# Patient Record
Sex: FEMALE | Race: OTHER | NOT HISPANIC OR LATINO | URBAN - METROPOLITAN AREA
[De-identification: names, ages, dates, MRNs, and addresses within clinical notes are randomized per-mention and may not be internally consistent; named-entity substitution may affect disease eponyms.]

---

## 2023-08-21 ENCOUNTER — EMERGENCY (EMERGENCY)
Facility: HOSPITAL | Age: 88
LOS: 1 days | Discharge: HOME | End: 2023-08-21
Attending: EMERGENCY MEDICINE
Payer: COMMERCIAL

## 2023-08-21 VITALS
OXYGEN SATURATION: 96 % | RESPIRATION RATE: 16 BRPM | TEMPERATURE: 98 F | HEIGHT: 62 IN | DIASTOLIC BLOOD PRESSURE: 90 MMHG | HEART RATE: 67 BPM | WEIGHT: 110.01 LBS | SYSTOLIC BLOOD PRESSURE: 136 MMHG

## 2023-08-21 PROCEDURE — 99285 EMERGENCY DEPT VISIT HI MDM: CPT

## 2023-08-21 NOTE — ED ADULT TRIAGE NOTE - CHIEF COMPLAINT QUOTE
Pt's family reports disorientation and weakness starting four days ago. Pt seen by PMD with abnormal MRI. Denies current symptoms. Neuro exam intact. A&Ox4 in triage, equal strength

## 2023-08-22 VITALS
HEART RATE: 59 BPM | OXYGEN SATURATION: 98 % | SYSTOLIC BLOOD PRESSURE: 148 MMHG | RESPIRATION RATE: 16 BRPM | DIASTOLIC BLOOD PRESSURE: 67 MMHG | TEMPERATURE: 98 F

## 2023-08-22 LAB
ALBUMIN SERPL ELPH-MCNC: 4.2 G/DL — SIGNIFICANT CHANGE UP (ref 3.3–5)
ALP SERPL-CCNC: 61 U/L — SIGNIFICANT CHANGE UP (ref 40–120)
ALT FLD-CCNC: 10 U/L — SIGNIFICANT CHANGE UP (ref 10–45)
ANION GAP SERPL CALC-SCNC: 12 MMOL/L — SIGNIFICANT CHANGE UP (ref 5–17)
APTT BLD: 28.1 SEC — SIGNIFICANT CHANGE UP (ref 24.5–35.6)
AST SERPL-CCNC: 14 U/L — SIGNIFICANT CHANGE UP (ref 10–40)
BASOPHILS # BLD AUTO: 0.04 K/UL — SIGNIFICANT CHANGE UP (ref 0–0.2)
BASOPHILS NFR BLD AUTO: 0.5 % — SIGNIFICANT CHANGE UP (ref 0–2)
BILIRUB SERPL-MCNC: 0.2 MG/DL — SIGNIFICANT CHANGE UP (ref 0.2–1.2)
BUN SERPL-MCNC: 15 MG/DL — SIGNIFICANT CHANGE UP (ref 7–23)
CALCIUM SERPL-MCNC: 9.9 MG/DL — SIGNIFICANT CHANGE UP (ref 8.4–10.5)
CHLORIDE SERPL-SCNC: 104 MMOL/L — SIGNIFICANT CHANGE UP (ref 96–108)
CHOLEST SERPL-MCNC: 224 MG/DL — HIGH
CO2 SERPL-SCNC: 24 MMOL/L — SIGNIFICANT CHANGE UP (ref 22–31)
CREAT SERPL-MCNC: 0.74 MG/DL — SIGNIFICANT CHANGE UP (ref 0.5–1.3)
EGFR: 77 ML/MIN/1.73M2 — SIGNIFICANT CHANGE UP
EOSINOPHIL # BLD AUTO: 0.39 K/UL — SIGNIFICANT CHANGE UP (ref 0–0.5)
EOSINOPHIL NFR BLD AUTO: 5.1 % — SIGNIFICANT CHANGE UP (ref 0–6)
GLUCOSE SERPL-MCNC: 117 MG/DL — HIGH (ref 70–99)
HCT VFR BLD CALC: 40.9 % — SIGNIFICANT CHANGE UP (ref 34.5–45)
HDLC SERPL-MCNC: 73 MG/DL — SIGNIFICANT CHANGE UP
HGB BLD-MCNC: 12.5 G/DL — SIGNIFICANT CHANGE UP (ref 11.5–15.5)
IMM GRANULOCYTES NFR BLD AUTO: 0.3 % — SIGNIFICANT CHANGE UP (ref 0–0.9)
INR BLD: 0.96 RATIO — SIGNIFICANT CHANGE UP (ref 0.85–1.18)
LIPID PNL WITH DIRECT LDL SERPL: 120 MG/DL — HIGH
LYMPHOCYTES # BLD AUTO: 3.42 K/UL — HIGH (ref 1–3.3)
LYMPHOCYTES # BLD AUTO: 44.9 % — HIGH (ref 13–44)
MCHC RBC-ENTMCNC: 25.8 PG — LOW (ref 27–34)
MCHC RBC-ENTMCNC: 30.6 GM/DL — LOW (ref 32–36)
MCV RBC AUTO: 84.5 FL — SIGNIFICANT CHANGE UP (ref 80–100)
MONOCYTES # BLD AUTO: 0.6 K/UL — SIGNIFICANT CHANGE UP (ref 0–0.9)
MONOCYTES NFR BLD AUTO: 7.9 % — SIGNIFICANT CHANGE UP (ref 2–14)
NEUTROPHILS # BLD AUTO: 3.15 K/UL — SIGNIFICANT CHANGE UP (ref 1.8–7.4)
NEUTROPHILS NFR BLD AUTO: 41.3 % — LOW (ref 43–77)
NON HDL CHOLESTEROL: 151 MG/DL — HIGH
NRBC # BLD: 0 /100 WBCS — SIGNIFICANT CHANGE UP (ref 0–0)
PLATELET # BLD AUTO: 413 K/UL — HIGH (ref 150–400)
POTASSIUM SERPL-MCNC: 4.1 MMOL/L — SIGNIFICANT CHANGE UP (ref 3.5–5.3)
POTASSIUM SERPL-SCNC: 4.1 MMOL/L — SIGNIFICANT CHANGE UP (ref 3.5–5.3)
PROT SERPL-MCNC: 7.4 G/DL — SIGNIFICANT CHANGE UP (ref 6–8.3)
PROTHROM AB SERPL-ACNC: 10.6 SEC — SIGNIFICANT CHANGE UP (ref 9.5–13)
RBC # BLD: 4.84 M/UL — SIGNIFICANT CHANGE UP (ref 3.8–5.2)
RBC # FLD: 18.7 % — HIGH (ref 10.3–14.5)
SODIUM SERPL-SCNC: 140 MMOL/L — SIGNIFICANT CHANGE UP (ref 135–145)
TRIGL SERPL-MCNC: 182 MG/DL — HIGH
TROPONIN T, HIGH SENSITIVITY RESULT: 18 NG/L — SIGNIFICANT CHANGE UP (ref 0–51)
WBC # BLD: 7.62 K/UL — SIGNIFICANT CHANGE UP (ref 3.8–10.5)
WBC # FLD AUTO: 7.62 K/UL — SIGNIFICANT CHANGE UP (ref 3.8–10.5)

## 2023-08-22 PROCEDURE — 84484 ASSAY OF TROPONIN QUANT: CPT

## 2023-08-22 PROCEDURE — 85730 THROMBOPLASTIN TIME PARTIAL: CPT

## 2023-08-22 PROCEDURE — 85025 COMPLETE CBC W/AUTO DIFF WBC: CPT

## 2023-08-22 PROCEDURE — 80061 LIPID PANEL: CPT

## 2023-08-22 PROCEDURE — 93005 ELECTROCARDIOGRAM TRACING: CPT

## 2023-08-22 PROCEDURE — 85610 PROTHROMBIN TIME: CPT

## 2023-08-22 PROCEDURE — 84100 ASSAY OF PHOSPHORUS: CPT

## 2023-08-22 PROCEDURE — 36415 COLL VENOUS BLD VENIPUNCTURE: CPT

## 2023-08-22 PROCEDURE — 71045 X-RAY EXAM CHEST 1 VIEW: CPT

## 2023-08-22 PROCEDURE — 71045 X-RAY EXAM CHEST 1 VIEW: CPT | Mod: 26

## 2023-08-22 PROCEDURE — 80053 COMPREHEN METABOLIC PANEL: CPT

## 2023-08-22 PROCEDURE — 83735 ASSAY OF MAGNESIUM: CPT

## 2023-08-22 PROCEDURE — 99285 EMERGENCY DEPT VISIT HI MDM: CPT | Mod: 25

## 2023-08-22 RX ORDER — CLOPIDOGREL BISULFATE 75 MG/1
1 TABLET, FILM COATED ORAL
Qty: 21 | Refills: 0
Start: 2023-08-22 | End: 2023-09-11

## 2023-08-22 RX ORDER — ASPIRIN/CALCIUM CARB/MAGNESIUM 324 MG
1 TABLET ORAL
Qty: 30 | Refills: 0
Start: 2023-08-22 | End: 2023-09-20

## 2023-08-22 RX ORDER — ATORVASTATIN CALCIUM 80 MG/1
1 TABLET, FILM COATED ORAL
Qty: 30 | Refills: 0
Start: 2023-08-22 | End: 2023-09-20

## 2023-08-22 NOTE — ED PROVIDER NOTE - CLINICAL SUMMARY MEDICAL DECISION MAKING FREE TEXT BOX
Dr. Mack's Note: Patient presents for evaluation of abnormal MRI in the setting of acute onset neuro symptoms 5 days old including what the patient's  describes as generalized weakness without any focality, confusion, slurred speech.  Patient talked to her PCP who ordered an outpatient MRI that was done today and upon the PCP receiving the results they called the patient to tell her to come immediately to the ER because of concern for stroke.  on exam patient is in no acute distress, well-appearing, unremarkable vital signs.  She does exhibit left-sided weakness 4 out of 5 strength in both upper and lower extremities along with a left-sided mild facial droop.  Retrospectively,  does agree that she had a more pronounced facial droop 5 days ago which has since then improved.  Patient also has been regaining some of his strength over those last few days.    Results from her outpatient MRI shows right-sided focus of acute to subacute infarct involving the right ganglial capsular region and adjacent frontal lobe white matter.  No acute hemorrhage.  It also shows a small old infarct in the right posterior temporal lobe.    Patient is outside of the window for any acute stroke intervention, however she will get evaluated by the stroke team for potential admission for further imaging, PT OT, further neuro care.

## 2023-08-22 NOTE — ED PROVIDER NOTE - PATIENT PORTAL LINK FT
You can access the FollowMyHealth Patient Portal offered by [facility] by registering at the following website: [email or web address]. By joining [employer]’s FollowMyHealth portal, you will also be able to view your health information using other applications (apps) compatible with our system.

## 2023-08-22 NOTE — ED PROVIDER NOTE - CARE PROVIDER_API CALL
Julio Cesar Ríos  Neurology  [address], [address]  [place] [address]  Phone: [phone]  Fax: [phone]  Follow Up Time:

## 2023-08-22 NOTE — CONSULT NOTE ADULT - SUBJECTIVE AND OBJECTIVE BOX
Neurology - Consult Note    -  Spectra: [phone] ([facility]), [phone] ([facility])  -    HPI: Patient ASHLEY SHELDON is a 89y (24-Dec-1933) Ion speaking woman with HTN, DM2 sent PCP for MRI B showing acute to subacute infarct. Patient reports 5-6 days ago starting to have slurring of her speech and left sided weakness. She went to her PCP and MRI B was ordered showing acute to subacute right ganglial capsular and adjacent frontal lobe. Patient currently takes asa 81 mg for unclear reason. At baseline, patient walks with cane. Denies numbness, changes to vision, headache, dizziness, heart palpitations. Ion  used.      NIHSS 3  premRS 3    Review of Systems:    All other review of systems is negative unless indicated above.    Allergies:  No Known Allergies      PMHx/PSHx/Family Hx: As above, otherwise see below       Social Hx:  No current use of tobacco, alcohol, or illicit drugs      Medications:  MEDICATIONS  (STANDING):    MEDICATIONS  (PRN):      Vitals:  T(C): 36.3 (08-22-23 @ 02:15), Max: 36.8 (08-21-23 @ 19:40)  HR: 65 (08-22-23 @ 02:15) (65 - 67)  BP: 150/79 (08-22-23 @ 02:15) (136/90 - 150/79)  RR: 16 (08-22-23 @ 02:15) (16 - 16)  SpO2: 95% (08-22-23 @ 02:15) (95% - 96%)    Physical Examination:  General - NAD    Neurologic Exam:  Mental status - Awake, Alert, Oriented to person, place, and Month, not year. Speech mildly dysarthric, fluent, repetition and naming intact. Follows simple commands only    Cranial nerves - PERRL, BTT b/l, EOMI, face sensation (V1-V3) intact b/l, Left nasolabial fold flattening, hearing intact b/l, palate with symmetric elevation, trapezius  5/5 strength b/l, tongue midline on protrusion with full lateral movement    Motor - Normal bulk and tone throughout. Left pronator drift.  Strength testing  Right arm antigravity no drift  Left arm antigravity mild drift  Left leg-pain limited, antigravity minor drift  Right leg antigravity with drift     Sensation - Light touch/temperature  intact throughout      Coordination - Finger to Nose intact b/l. No tremors appreciated        Labs:                        12.5   7.62  )-----------( 413      ( 22 Aug 2023 02:51 )             40.9     08-22    140  |  104  |  15  ----------------------------<  117<H>  4.1   |  24  |  0.74    Ca    9.9      22 Aug 2023 02:51  Phos  4.3     08-22  Mg     1.9     08-22    TPro  7.4  /  Alb  4.2  /  TBili  0.2  /  DBili  x   /  AST  14  /  ALT  10  /  AlkPhos  61  08-22    CAPILLARY BLOOD GLUCOSE        LIVER FUNCTIONS - ( 22 Aug 2023 02:51 )  Alb: 4.2 g/dL / Pro: 7.4 g/dL / ALK PHOS: 61 U/L / ALT: 10 U/L / AST: 14 U/L / GGT: x             PT/INR - ( 22 Aug 2023 02:51 )   PT: 10.6 sec;   INR: 0.96 ratio         PTT - ( 22 Aug 2023 02:51 )  PTT:28.1 sec            Radiology:  MRI B w/o per chart review:  MRI shows right-sided focus of acute to subacute infarct involving the right ganglial capsular region and adjacent frontal lobe white matter.  No acute hemorrhage.  It also shows a small old infarct in the right posterior temporal lobe.

## 2023-08-22 NOTE — CONSULT NOTE ADULT - ASSESSMENT
89y (24-Dec-1933) Ion speaking woman with HTN, DM2 sent PCP for MRI B showing acute to subacute infarct. Patient reports 5-6 days ago starting to have slurring of her speech and left sided weakness. She went to her PCP and MRI B was ordered showing acute to subacute right ganglial capsular and adjacent frontal lobe. Patient currently takes asa 81 mg for unclear reason. At baseline, patient walks with cane. Denies numbness, changes to vision, headache, dizziness, heart palpitations. On exam, left pronator drift.     Impression: Left sided weakness and dysarthria due to acute to subacute right ganglial capsular and adjacent frontal lobe Mechanism small vessel disease     Recommendation:  [] Continue ASA 81 mg daily  [] Start Plavix 75 mg for 3 weeks  [] Atorvastatin 80MG QHS, titrate to LDL<70  [] TTE and ILR as outpatient  [] Send HbA1C and Lipid Panel, follow results as outpatient   [] Normotension  [] No neurological contraindication for discharge   [] Follow up with Dr. Julio Cesar Ríos Neurology [address], [place] [address]     Case discussed with stroke fellow Dr Nguyen undersupervision of stroke attending Dr. Prasad.       89y (24-Dec-1933) Ion speaking woman with HTN, DM2 sent PCP for MRI B showing acute to subacute infarct. Patient reports 5-6 days ago starting to have slurring of her speech and left sided weakness. She went to her PCP and MRI B was ordered showing acute to subacute right ganglial capsular and adjacent frontal lobe. Patient currently takes asa 81 mg for unclear reason. At baseline, patient walks with cane. Denies numbness, changes to vision, headache, dizziness, heart palpitations. On exam, left pronator drift.     Impression: Mild Left upper extremity weakness with dysarthria due to acute to subacute right ganglial capsular and adjacent frontal lobe Mechanism small vessel disease     Recommendation:  [] Continue ASA 81 mg daily  [] Start Plavix 75 mg for 3 weeks  [] Atorvastatin 80MG QHS, titrate to LDL<70  [] TTE and ILR as outpatient  [] Send HbA1C and Lipid Panel, follow results as outpatient   [] Normotension  [] No neurological contraindication for discharge   [] Follow up with Dr. Julio Cesar Ríos Neurology [address], [place] [address]     Case discussed with stroke fellow Dr Nguyen undersupervision of stroke attending Dr. Prasad.

## 2023-08-22 NOTE — ED ADULT NURSE NOTE - OBJECTIVE STATEMENT
89 y/o female PMHx                  arrives to [facility] ED by car from home with family with c/o abnormal MRI. Pt's family provides history at bedside, states patient has onset left facial droop and left sided weakness 5 days ago, PCP ordered outpatient MRI for yesterday, sent to ED for brain MRI results showing right-sided focus of acute to subacute infarct. Patient is A&Ox4, residual left sided facial droop and 4/5 LUE and LLE weakness. Respirations spontaneous and unlabored. Denies SOB, dyspnea, cough, chest pain, palpitations. No abdominal pain, soft NT/ND. No n/v/d. Denies urinary symptoms. Denies fever/chills. No sick contacts. Skin is warm/dry and normal for race. Ambulates with a cane at baseline. 88 y/o female PMHx HTN, DM2 arrives to [facility] ED by car from home with family with c/o abnormal MRI. Pt's family provides history at bedside, states patient has onset left facial droop and left sided weakness 5 days ago, PCP ordered outpatient MRI for yesterday, sent to ED for abnormal brain MRI results showing right-sided focus of acute to subacute infarct. Patient is A&Ox4, residual left sided facial droop and 4/5 LUE and LLE weakness. Respirations spontaneous and unlabored. Denies SOB, dyspnea, cough, chest pain, palpitations. No abdominal pain, soft NT/ND. No n/v/d. Denies urinary symptoms. Denies fever/chills. No sick contacts. Skin is warm/dry and normal for race. Ambulates with a cane at baseline.

## 2023-08-22 NOTE — ED PROVIDER NOTE - OBJECTIVE STATEMENT
Patient is a 89-year-old female with history of hypertension, hypothyroidism, diabetes and baby aspirin sent in by PMD for concerns of stroke.  Accompanied by son at the bedside who provides collateral information.  States about 5 days ago patient was complaining of generalized weakness with confusion and slurred speech. Family also noted left facial droop.  Went to see her PCP who ordered an MRI which was done today and was instructed to come to the emergency department.  Denies any recent fever, vomiting, chest pain, shortness of breath, abdominal pain, urinary or bowel complaints.

## 2023-08-22 NOTE — ED ADULT NURSE NOTE - SUICIDE SCREENING QUESTION 3
Hpi Title: Evaluation of Skin Lesions How Severe Are Your Spot(S)?: mild Have Your Spot(S) Been Treated In The Past?: has not been treated No

## 2023-08-22 NOTE — ED PROVIDER NOTE - PROGRESS NOTE DETAILS
David PGY3  Consulted neuro for further recommendation. David PGY3  Discussed with neuro - recommends outpatient follow up.   Patient ambulated to the bathroom on her own. Attempted to reach son without answer. David PGY3  Discussed with patient's son - agreeable with neurology. Refugio provide neurology referral. And send medications per neuro rec. David PGY3  Discussed with the patient and patient's son - agreeable with discharge with outpatient neurology follow up. Discussed results so far in the ED and will send medications per neuro rec.

## 2023-08-22 NOTE — ED PROVIDER NOTE - PHYSICAL EXAMINATION
Vitals: I have reviewed the patients vital signs  General: Well dressed, well appearing, no acute distress  HEENT: Atraumatic, normocephalic, airway patent  Eyes: EOMI, tracking appropriately  Neck: no tracheal deviation, no JVD  Chest/Lungs: no trauma, symmetric chest rise, speaking in complete sentences, no WOB  Heart: skin and extremities well perfused, regular rate and rhythm  Abdomen: soft, nontender and nondistended   Neuro: A+Ox3, 4/5 strength in LUE/LLE, L facial droop, normal sensation in all 4 extremities, intact finger-to-nose   MSK: strength at baseline in all extremities, no muscle wasting or atrophy  Skin: no cyanosis, no jaundice, no new emergent lesions

## 2023-08-22 NOTE — ED PROVIDER NOTE - NSFOLLOWUPINSTRUCTIONS_ED_ALL_ED_FT
You were seen in the emergency department for slurred speech, confusion.   Your workup in the emergency department includes bloodwork.   You can find the results of all the tests in this discharge packet.   Please follow up with your primary care doctor within 48 hours for continuation of care.   Please follow up with neurology for further management.   Return to the emergency department if you experience any new/concerning/worsening symptoms such as but not limited to: fever (>100.3F), intractable nausea, vomiting, chest pain, shortness of breath, abdominal pain.     Neurology recommends that we start these medications:  1) Aspirin: 81mg once a day   2) Plavix: 75mg once a day for 3 weeks   3) Atorvastatin:  80mg once a day

## 2023-08-22 NOTE — ED ADULT NURSE NOTE - NSFALLHARMRISKINTERV_ED_ALL_ED
Monitor for mental status changes and reorient to person, place, and time, as needed/Reinforce activity limits and safety measures with patient and family/Bed in lowest position, wheels locked, appropriate side rails in place/[place] to call system/Physically safe environment - no spills, clutter or unnecessary equipment

## 2023-08-23 NOTE — ED POST DISCHARGE NOTE - DETAILS
8/23: kamila to call back admin line using language line  [id number] 8/24: LM on pts VM to call back admin line in regards to results. Gagan George PA-C 8/25: spoke with patient and son, made aware of results and rec to repeat in a few months with pmd to determine if medication is working - Leena Castillo PA-C

## 2024-01-29 ENCOUNTER — NON-APPOINTMENT (OUTPATIENT)
Age: 89
End: 2024-01-29

## 2024-02-06 PROBLEM — Z00.00 ENCOUNTER FOR PREVENTIVE HEALTH EXAMINATION: Status: ACTIVE | Noted: 2024-02-06

## 2024-02-15 ENCOUNTER — LABORATORY RESULT (OUTPATIENT)
Age: 89
End: 2024-02-15

## 2024-02-15 ENCOUNTER — APPOINTMENT (OUTPATIENT)
Dept: WOUND CARE | Facility: CLINIC | Age: 89
End: 2024-02-15
Payer: MEDICARE

## 2024-02-15 VITALS
HEART RATE: 66 BPM | TEMPERATURE: 98 F | SYSTOLIC BLOOD PRESSURE: 158 MMHG | OXYGEN SATURATION: 96 % | DIASTOLIC BLOOD PRESSURE: 80 MMHG

## 2024-02-15 VITALS — HEIGHT: 60 IN | WEIGHT: 140 LBS | BODY MASS INDEX: 27.48 KG/M2

## 2024-02-15 DIAGNOSIS — Z83.3 FAMILY HISTORY OF DIABETES MELLITUS: ICD-10-CM

## 2024-02-15 DIAGNOSIS — Z71.9 COUNSELING, UNSPECIFIED: ICD-10-CM

## 2024-02-15 PROCEDURE — 99204 OFFICE O/P NEW MOD 45 MIN: CPT | Mod: 25

## 2024-02-15 PROCEDURE — 11102 TANGNTL BX SKIN SINGLE LES: CPT

## 2024-02-15 RX ORDER — ATORVASTATIN CALCIUM 80 MG/1
80 TABLET, FILM COATED ORAL
Refills: 0 | Status: ACTIVE | COMMUNITY

## 2024-02-15 RX ORDER — OMEPRAZOLE 40 MG/1
40 CAPSULE, DELAYED RELEASE ORAL
Refills: 0 | Status: ACTIVE | COMMUNITY

## 2024-02-15 RX ORDER — ASPIRIN 81 MG
81 TABLET, DELAYED RELEASE (ENTERIC COATED) ORAL
Refills: 0 | Status: ACTIVE | COMMUNITY

## 2024-02-15 RX ORDER — ERGOCALCIFEROL 1.25 MG/1
1.25 MG CAPSULE ORAL
Refills: 0 | Status: ACTIVE | COMMUNITY

## 2024-02-15 RX ORDER — FEXOFENADINE HYDROCHLORIDE 180 MG/1
180 TABLET ORAL
Refills: 0 | Status: ACTIVE | COMMUNITY

## 2024-02-15 RX ORDER — SITAGLIPTIN AND METFORMIN HYDROCHLORIDE 50; 500 MG/1; MG/1
50-500 TABLET, FILM COATED ORAL
Refills: 0 | Status: ACTIVE | COMMUNITY

## 2024-02-15 RX ORDER — DULOXETINE HYDROCHLORIDE 20 MG/1
20 CAPSULE, DELAYED RELEASE PELLETS ORAL
Refills: 0 | Status: ACTIVE | COMMUNITY

## 2024-02-15 RX ORDER — AMLODIPINE BESYLATE 5 MG/1
5 TABLET ORAL
Refills: 0 | Status: ACTIVE | COMMUNITY

## 2024-02-15 RX ORDER — CLOPIDOGREL BISULFATE 75 MG/1
75 TABLET, FILM COATED ORAL
Refills: 0 | Status: ACTIVE | COMMUNITY

## 2024-02-19 NOTE — PHYSICAL EXAM
[JVD] : no jugular venous distention  [Normal Breath Sounds] : Normal breath sounds [Normal Rate and Rhythm] : normal rate and rhythm [1+] : left 1+ [Ankle Swelling (On Exam)] : present [Abdomen Tenderness] : ~T ~M No abdominal tenderness [Skin Ulcer] : ulcer [Alert] : alert [Oriented to Place] : oriented to place [Calm] : calm [de-identified] : 89 yr old nad [de-identified] : ly [Please See PDF for [employer]] : Please See PDF for [employer].

## 2024-02-19 NOTE — HISTORY OF PRESENT ILLNESS
[FreeTextEntry1] : location leg  severity went to urgent [facility] for infection  timing/duration3 weeks  quality pain, ooze  other s/p augmentin and mupirsin

## 2024-02-19 NOTE — PLAN
[FreeTextEntry1] : 2/15/24 Plan- betadine/adhesive foam/alissa/ace Culture and biopsy sent out - will call with results Follow up in 2-3 weeks

## 2024-02-19 NOTE — ASSESSMENT
[FreeTextEntry1] :    89 YR OLD S/P CELLULITIS, dm  LEFT LEG  ON AUGMENTIN S/P CVA 8/2023 ON   datacomplexity- mod lab, xr, old rec, test resultsreview,, visualize imagecptreview previous  risk high surgery frailty, biopsy tolerated   suppplies ordered  nursing/woundcare orders  : done will need vascular testing

## 2024-02-28 LAB — CORE LAB BIOPSY: NORMAL

## 2024-03-07 ENCOUNTER — OUTPATIENT (OUTPATIENT)
Dept: OUTPATIENT SERVICES | Facility: HOSPITAL | Age: 89
LOS: 1 days | End: 2024-03-07
Payer: COMMERCIAL

## 2024-03-07 ENCOUNTER — APPOINTMENT (OUTPATIENT)
Dept: WOUND CARE | Facility: HOSPITAL | Age: 89
End: 2024-03-07
Payer: MEDICARE

## 2024-03-07 ENCOUNTER — APPOINTMENT (OUTPATIENT)
Dept: WOUND CARE | Facility: HOSPITAL | Age: 89
End: 2024-03-07

## 2024-03-07 VITALS
HEART RATE: 69 BPM | OXYGEN SATURATION: 98 % | TEMPERATURE: 97.8 F | RESPIRATION RATE: 18 BRPM | SYSTOLIC BLOOD PRESSURE: 131 MMHG | DIASTOLIC BLOOD PRESSURE: 77 MMHG

## 2024-03-07 DIAGNOSIS — Z86.73 PERSONAL HISTORY OF TRANSIENT ISCHEMIC ATTACK (TIA), AND CEREBRAL INFARCTION W/OUT RESIDUAL DEFICITS: ICD-10-CM

## 2024-03-07 PROCEDURE — 99213 OFFICE O/P EST LOW 20 MIN: CPT | Mod: 25

## 2024-03-07 PROCEDURE — 11042 DBRDMT SUBQ TIS 1ST 20SQCM/<: CPT

## 2024-03-07 PROCEDURE — G0463: CPT | Mod: 25

## 2024-03-07 RX ORDER — TRIAMCINOLONE ACETONIDE 0.25 MG/G
0.03 OINTMENT TOPICAL 3 TIMES DAILY
Qty: 1 | Refills: 1 | Status: ACTIVE | COMMUNITY
Start: 2024-03-07 | End: 1900-01-01

## 2024-03-07 NOTE — PLAN
[FreeTextEntry1] : 3/7/24 continue current rx, us oand rohith ordered  2/15/24 Plan- betadine/adhesive foam/alissa/ace Culture and biopsy sent out - will call with results Follow up in 2-3 weeks

## 2024-03-07 NOTE — ASSESSMENT
[FreeTextEntry1] :    89 YR OLD S/P CELLULITIS, dm  LEFT LEG  ON AUGMENTIN S/P CVA 8/2023   path negative, and culture negative,  wound smaller, betadine wrap kerlex and ace  datacomplexity- mod lab, xr, old rec, test resultsreview,, visualize imagecptreview previous  risk high surgery frailty, biopsy tolerated   suppplies ordered  nursing/woundcare orders  : done will need vascular testing

## 2024-03-07 NOTE — PHYSICAL EXAM
[JVD] : no jugular venous distention  [Normal Breath Sounds] : Normal breath sounds [Normal Rate and Rhythm] : normal rate and rhythm [1+] : left 1+ [Abdomen Tenderness] : ~T ~M No abdominal tenderness [Ankle Swelling (On Exam)] : present [Skin Ulcer] : ulcer [Alert] : alert [Oriented to Place] : oriented to place [de-identified] : 89 yr old nad [Calm] : calm [Please See PDF for [employer]] : Please See PDF for [employer]. [de-identified] : ly

## 2024-03-07 NOTE — HISTORY OF PRESENT ILLNESS
[FreeTextEntry1] : location leg left- doing better, less pain  severity went to urgent [facility] for infection  timing/duration3 weeks  quality pain, ooze-- path neg for ca, c/s neg  other s/p augmentin and mupirsin

## 2024-03-28 ENCOUNTER — APPOINTMENT (OUTPATIENT)
Dept: WOUND CARE | Facility: HOSPITAL | Age: 89
End: 2024-03-28
Payer: MEDICARE

## 2024-03-28 DIAGNOSIS — Z86.39 PERSONAL HISTORY OF OTHER ENDOCRINE, NUTRITIONAL AND METABOLIC DISEASE: ICD-10-CM

## 2024-03-28 PROCEDURE — 11042 DBRDMT SUBQ TIS 1ST 20SQCM/<: CPT

## 2024-03-28 PROCEDURE — 93922 UPR/L XTREMITY ART 2 LEVELS: CPT | Mod: 26

## 2024-03-28 PROCEDURE — 93970 EXTREMITY STUDY: CPT | Mod: 26

## 2024-03-28 PROCEDURE — 99213 OFFICE O/P EST LOW 20 MIN: CPT | Mod: 25

## 2024-03-28 NOTE — PLAN
[FreeTextEntry1] : 3/28/24 continue compression until closed foam only with wrap 3/7/24 continue current rx, us oand rohith ordered  2/15/24 Plan- betadine/adhesive foam/alissa/ace Culture and biopsy sent out - will call with results Follow up in 2-3 weeks

## 2024-03-28 NOTE — ASSESSMENT
[FreeTextEntry1] :    89 YR OLD S/P CELLULITIS, dm  LEFT LEG  ON AUGMENTIN S/P CVA 8/2023   path negative, and culture negative,  us neg for dvt rohith 1.3 right 1.1 left  wound smaller, betadine wrap kerlex and ace  datacomplexity- mod lab, xr, old rec, test resultsreview,, visualize imagecptreview previous  risk high surgery frailty, biopsy tolerated  suppplies ordered nursing/woundcare orders  : done will need vascular testing

## 2024-03-28 NOTE — PHYSICAL EXAM
[JVD] : no jugular venous distention  [Normal Breath Sounds] : Normal breath sounds [Normal Rate and Rhythm] : normal rate and rhythm [1+] : left 1+ [Ankle Swelling (On Exam)] : present [Abdomen Tenderness] : ~T ~M No abdominal tenderness [Skin Ulcer] : ulcer [Alert] : alert [Oriented to Place] : oriented to place [Calm] : calm [de-identified] : 89 yr old nad [de-identified] : ly [Please See PDF for [employer]] : Please See PDF for [employer].

## 2024-03-28 NOTE — HISTORY OF PRESENT ILLNESS
[FreeTextEntry1] : location leg left- doing better, less pain getting us- no dvt or reflux   severity went to [facility] for infection  timing/duration3 weeks  quality pain, ooze-- path neg for ca, c/s neg  other s/p augmentin and mupirsin

## 2024-04-01 DIAGNOSIS — Z86.73 PERSONAL HISTORY OF TRANSIENT ISCHEMIC ATTACK (TIA), AND CEREBRAL INFARCTION WITHOUT RESIDUAL DEFICITS: ICD-10-CM

## 2024-04-01 DIAGNOSIS — S81.802A UNSPECIFIED OPEN WOUND, LEFT LOWER LEG, INITIAL ENCOUNTER: ICD-10-CM

## 2024-04-01 DIAGNOSIS — Z86.39 PERSONAL HISTORY OF OTHER ENDOCRINE, NUTRITIONAL AND METABOLIC DISEASE: ICD-10-CM
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
## 2024-04-17 ENCOUNTER — NON-APPOINTMENT (OUTPATIENT)
Age: 89
End: 2024-04-17

## 2024-04-18 ENCOUNTER — OUTPATIENT (OUTPATIENT)
Dept: OUTPATIENT SERVICES | Facility: HOSPITAL | Age: 89
LOS: 1 days | End: 2024-04-18
Payer: COMMERCIAL

## 2024-04-18 ENCOUNTER — APPOINTMENT (OUTPATIENT)
Dept: WOUND CARE | Facility: HOSPITAL | Age: 89
End: 2024-04-18
Payer: MEDICARE

## 2024-04-18 VITALS — TEMPERATURE: 98.2 F | HEART RATE: 64 BPM | OXYGEN SATURATION: 98 %

## 2024-04-18 VITALS — DIASTOLIC BLOOD PRESSURE: 72 MMHG | HEART RATE: 66 BPM | SYSTOLIC BLOOD PRESSURE: 159 MMHG

## 2024-04-18 DIAGNOSIS — L29.9 PRURITUS, UNSPECIFIED: ICD-10-CM

## 2024-04-18 DIAGNOSIS — S81.802A UNSPECIFIED OPEN WOUND, LEFT LOWER LEG, INITIAL ENCOUNTER: ICD-10-CM

## 2024-04-18 PROCEDURE — 11042 DBRDMT SUBQ TIS 1ST 20SQCM/<: CPT

## 2024-04-18 PROCEDURE — G0463: CPT | Mod: 25

## 2024-04-18 PROCEDURE — 99213 OFFICE O/P EST LOW 20 MIN: CPT | Mod: 25

## 2024-04-18 RX ORDER — CALENDULA OFFICINALIS FLOWERING TOP 1 [HP_X]/G
CREAM TOPICAL
Qty: 1 | Refills: 2 | Status: ACTIVE | COMMUNITY
Start: 2024-04-18 | End: 1900-01-01

## 2024-04-18 RX ORDER — NYSTATIN AND TRIAMCINOLONE ACETONIDE 100000; 1 MG/G; MG/G
100000-0.1 CREAM TOPICAL
Qty: 1 | Refills: 3 | Status: ACTIVE | COMMUNITY
Start: 2024-04-18 | End: 1900-01-01

## 2024-04-19 PROBLEM — L29.9 PRURITUS: Status: ACTIVE | Noted: 2024-04-19

## 2024-04-19 RX ORDER — TRIAMCINOLONE ACETONIDE 1 MG/G
0.1 CREAM TOPICAL DAILY
Qty: 1 | Refills: 2 | Status: ACTIVE | COMMUNITY
Start: 2024-04-19 | End: 1900-01-01

## 2024-04-19 RX ORDER — NYSTATIN 100000 [USP'U]/G
100000 CREAM TOPICAL TWICE DAILY
Qty: 1 | Refills: 0 | Status: ACTIVE | COMMUNITY
Start: 2024-04-19 | End: 1900-01-01

## 2024-04-25 PROBLEM — S81.802A WOUND OF LEFT LEG: Status: ACTIVE | Noted: 2024-02-15

## 2024-04-25 NOTE — PLAN
[FreeTextEntry1] : 4/18- meds for itch, socks 3/28/24 continue compression until closed foam only with wrap 3/7/24 continue current rx, us oand rohith ordered  2/15/24 Plan- betadine/adhesive foam/alissa/ace Culture and biopsy sent out - will call with results Follow up in 2-3 weeks

## 2024-04-25 NOTE — ASSESSMENT
[FreeTextEntry1] :    89 YR OLD S/P CELLULITIS, dm  LEFT LEG  ON AUGMENTIN S/P CVA 8/2023   path negative, and culture negative,  us neg for dvt rohith 1.3 right 1.1 left eschar sharply debrided wound smaller, betadine wrap kerlex and ace meds for itch rx  datacomplexity- mod lab, xr, old rec, test resultsreview,, visualize imagecptreview previous  risk high surgery frailty, biopsy tolerated  suppplies ordered nursing/woundcare orders  : done will need vascular testing

## 2024-04-25 NOTE — HISTORY OF PRESENT ILLNESS
[FreeTextEntry1] : location leg left- doing better, less pain getting us- no dvt or reflux here with daughter no fever   severity went to [facility] for infection  timing/duration3 weeks  quality pain, ooze-- path neg for ca, c/s neg  other s/p augmentin and mupirsin

## 2024-04-25 NOTE — PHYSICAL EXAM
[Normal Breath Sounds] : Normal breath sounds [Normal Rate and Rhythm] : normal rate and rhythm [1+] : left 1+ [Ankle Swelling (On Exam)] : present [Skin Ulcer] : ulcer [Alert] : alert [Oriented to Place] : oriented to place [Calm] : calm [Please See PDF for [employer]] : Please See PDF for [employer]. [JVD] : no jugular venous distention  [Abdomen Tenderness] : ~T ~M No abdominal tenderness [de-identified] : 89 yr old nad [de-identified] : ly [FreeTextEntry1] : 22.5, 23.5 ankle

## 2024-05-17 DIAGNOSIS — L29.9 PRURITUS, UNSPECIFIED: ICD-10-CM

## 2024-05-17 DIAGNOSIS — S81.802A UNSPECIFIED OPEN WOUND, LEFT LOWER LEG, INITIAL ENCOUNTER: ICD-10-CM

## 2024-05-23 ENCOUNTER — APPOINTMENT (OUTPATIENT)
Dept: WOUND CARE | Facility: HOSPITAL | Age: 89
End: 2024-05-23